# Patient Record
Sex: FEMALE
[De-identification: names, ages, dates, MRNs, and addresses within clinical notes are randomized per-mention and may not be internally consistent; named-entity substitution may affect disease eponyms.]

---
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
## 2022-03-10 ENCOUNTER — APPOINTMENT (OUTPATIENT)
Dept: ENDOCRINOLOGY | Facility: CLINIC | Age: 22
End: 2022-03-10
Payer: MEDICAID

## 2022-03-10 VITALS
HEIGHT: 60.63 IN | WEIGHT: 149 LBS | SYSTOLIC BLOOD PRESSURE: 115 MMHG | BODY MASS INDEX: 28.5 KG/M2 | HEART RATE: 74 BPM | DIASTOLIC BLOOD PRESSURE: 76 MMHG

## 2022-03-10 PROCEDURE — 99204 OFFICE O/P NEW MOD 45 MIN: CPT

## 2022-03-11 LAB
25(OH)D3 SERPL-MCNC: 11.2 NG/ML
ALBUMIN SERPL ELPH-MCNC: 4.7 G/DL
ALP BLD-CCNC: 48 U/L
ALT SERPL-CCNC: 30 U/L
ANION GAP SERPL CALC-SCNC: 11 MMOL/L
AST SERPL-CCNC: 28 U/L
BASOPHILS # BLD AUTO: 0.01 K/UL
BASOPHILS NFR BLD AUTO: 0.2 %
BILIRUB SERPL-MCNC: 0.5 MG/DL
BUN SERPL-MCNC: 12 MG/DL
CALCIUM SERPL-MCNC: 9.3 MG/DL
CHLORIDE SERPL-SCNC: 103 MMOL/L
CHOLEST SERPL-MCNC: 192 MG/DL
CO2 SERPL-SCNC: 24 MMOL/L
CREAT SERPL-MCNC: 0.73 MG/DL
EGFR: 121 ML/MIN/1.73M2
EOSINOPHIL # BLD AUTO: 0.06 K/UL
EOSINOPHIL NFR BLD AUTO: 1.1 %
ESTIMATED AVERAGE GLUCOSE: 82 MG/DL
GLUCOSE SERPL-MCNC: 85 MG/DL
HBA1C MFR BLD HPLC: 4.5 %
HCG SERPL-MCNC: <1 MIU/ML
HCT VFR BLD CALC: 41.1 %
HDLC SERPL-MCNC: 54 MG/DL
HGB BLD-MCNC: 12.8 G/DL
IMM GRANULOCYTES NFR BLD AUTO: 0.2 %
LDLC SERPL CALC-MCNC: 123 MG/DL
LYMPHOCYTES # BLD AUTO: 2.36 K/UL
LYMPHOCYTES NFR BLD AUTO: 43.6 %
MAN DIFF?: NORMAL
MCHC RBC-ENTMCNC: 29.2 PG
MCHC RBC-ENTMCNC: 31.1 GM/DL
MCV RBC AUTO: 93.8 FL
MONOCYTES # BLD AUTO: 0.3 K/UL
MONOCYTES NFR BLD AUTO: 5.5 %
NEUTROPHILS # BLD AUTO: 2.67 K/UL
NEUTROPHILS NFR BLD AUTO: 49.4 %
NONHDLC SERPL-MCNC: 138 MG/DL
PLATELET # BLD AUTO: 231 K/UL
POTASSIUM SERPL-SCNC: 4.7 MMOL/L
PROT SERPL-MCNC: 7.4 G/DL
RBC # BLD: 4.38 M/UL
RBC # FLD: 13.4 %
SODIUM SERPL-SCNC: 139 MMOL/L
T3 SERPL-MCNC: 137 NG/DL
T4 FREE SERPL-MCNC: 1.1 NG/DL
T4 SERPL-MCNC: 7.2 UG/DL
THYROGLOB AB SERPL-ACNC: <20 IU/ML
THYROPEROXIDASE AB SERPL IA-ACNC: 13.2 IU/ML
TRIGL SERPL-MCNC: 75 MG/DL
TSH SERPL-ACNC: 1.93 UIU/ML
VIT B12 SERPL-MCNC: 343 PG/ML
WBC # FLD AUTO: 5.41 K/UL

## 2022-04-08 NOTE — PHYSICAL EXAM
[Alert] : alert [Healthy Appearance] : healthy appearance [No Acute Distress] : no acute distress [Normal Sclera/Conjunctiva] : normal sclera/conjunctiva [Normal Hearing] : hearing was normal [No Neck Mass] : no neck mass was observed [No LAD] : no lymphadenopathy [Supple] : the neck was supple [Thyroid Not Enlarged] : the thyroid was not enlarged [No Respiratory Distress] : no respiratory distress [Clear to Auscultation] : lungs were clear to auscultation bilaterally [Normal S1, S2] : normal S1 and S2 [Normal Rate] : heart rate was normal [Regular Rhythm] : with a regular rhythm [No Stigmata of Cushings Syndrome] : no stigmata of Cushings Syndrome [Normal Gait] : normal gait [Hirsutism] : hirsutism present [Normal Insight/Judgement] : insight and judgment were intact [Kyphosis] : no kyphosis present [Acanthosis Nigricans] : no acanthosis nigricans [de-identified] : no moon facies, no supraclavicular fat pads

## 2022-04-08 NOTE — ASSESSMENT
[FreeTextEntry1] : Polycystic ovarian syndrome. She has met criteria for polycystic ovarian syndrome in the setting of oligomenorrhea and clinical evidence of hyperandrogenism. Evaluation for secondary causes of irregular menses previously unremarkable. We discussed the potential risks of polycystic ovarian syndrome, including but not limited to endometrial hyperplasia/cancer, issues with androgen excess, metabolic effects/insulin resistance. We discussed that first line therapy for polycystic ovarian syndrome is a combined oral contraceptive pill. We discussed the risks and benefits of a combined oral contraceptive pill, including but not limited to thromboembolic risk. We will start a combined oral contraceptive pill with norgestimate, with low androgenic activity and low thromboembolic risk.\par \par Abnormal thyroid function test. We will send a thyroid function panel; thyroid peroxidase and thyroglobulin antibodies now. Further evaluation and management pending results. \par \par Return to see me in 3 months. \par \par CC:\par Dr. Conner Jhaveri, Fax [phone]

## 2022-04-08 NOTE — HISTORY OF PRESENT ILLNESS
[FreeTextEntry1] : Ms. Nascimento is a 21 year-old woman presenting to establish care with me for polycystic ovarian syndrome and an abnormal thyroid function test. She is a former patient of Dr. Patience Springer, last seen in March 2019. She is studying occupational therapy. \par \par Polycystic ovarian syndrome.\par She was diagnosed with polycystic ovarian syndrome in the setting of oligomenorrhea and clinical evidence of hyperandrogenism. Evaluation for other causes of oligomenorrhea previously unremarkable. \par Menstrual periods are every 40-50 days. \par She shaves hirsutism. She has had hair thinning and acne. \par She was on a combined oral contraceptive pill in the past; she does not remember the name. \par \par Abnormal thyroid function test.\par She was noted to have an abnormal thyroid function test a few months ago; report not available.\par No previous history of abnormal thyroid function tests.\par She is not taking any medications.\par No history of radiation exposure.\par Mother with history of hypothyroidism. Maternal cousins with thyroid disease.\par \par She has had constipation. She has had occasional depression/anxiety. No change in weight, palpitations, diarrhea, skin changes.

## 2022-04-08 NOTE — ADDENDUM
[FreeTextEntry1] : Recent laboratory results as below. TSH, T4/T3 within range. Thyroid peroxidase and thyroglobulin antibodies negative. HbA1c within range. Recommend lifestyle modification for lipid panel. Vitamin D low and recommend ergocalciferol 50,000 intl units weekly for three months. Other test results within range. I left a telephone message for Ms. Nascimento to discuss. 3/11/22\par \par Ms. Nascimento left a telephone message to discuss results. I was unable to reach her and left a telephone message for call back. I will send her a letter with her results. 3/31/22\par \par I spoke with Ms. Nascimento about her results above. She had questions about irregular bleeding when starting a combined oral contraceptive pill. 4/08/22

## 2022-06-09 ENCOUNTER — APPOINTMENT (OUTPATIENT)
Dept: ENDOCRINOLOGY | Facility: CLINIC | Age: 22
End: 2022-06-09

## 2022-09-28 ENCOUNTER — APPOINTMENT (OUTPATIENT)
Dept: ENDOCRINOLOGY | Facility: CLINIC | Age: 22
End: 2022-09-28

## 2022-09-28 VITALS — SYSTOLIC BLOOD PRESSURE: 126 MMHG | DIASTOLIC BLOOD PRESSURE: 75 MMHG | WEIGHT: 144 LBS | HEART RATE: 69 BPM

## 2022-09-28 DIAGNOSIS — R79.89 OTHER SPECIFIED ABNORMAL FINDINGS OF BLOOD CHEMISTRY: ICD-10-CM

## 2022-09-28 PROCEDURE — 99214 OFFICE O/P EST MOD 30 MIN: CPT

## 2022-09-28 NOTE — PHYSICAL EXAM
[Alert] : alert [Healthy Appearance] : healthy appearance [No Acute Distress] : no acute distress [Normal Sclera/Conjunctiva] : normal sclera/conjunctiva [Normal Hearing] : hearing was normal [No Respiratory Distress] : no respiratory distress [No Stigmata of Cushings Syndrome] : no stigmata of Cushings Syndrome [Normal Gait] : normal gait [Normal Insight/Judgement] : insight and judgment were intact [Kyphosis] : no kyphosis present [Acanthosis Nigricans] : no acanthosis nigricans [de-identified] : no moon facies, no supraclavicular fat pads

## 2022-09-28 NOTE — ASSESSMENT
[FreeTextEntry1] : Polycystic ovarian syndrome. She has met criteria for polycystic ovarian syndrome in the setting of oligomenorrhea and clinical evidence of hyperandrogenism. Evaluation for secondary causes of irregular menses previously unremarkable. We discussed the potential risks of polycystic ovarian syndrome, including but not limited to endometrial hyperplasia/cancer, issues with androgen excess, metabolic effects/insulin resistance. We discussed that first line therapy for polycystic ovarian syndrome is a combined oral contraceptive pill. We discussed the risks and benefits of a combined oral contraceptive pill, including but not limited to thromboembolic risk. She is tolerating a combined oral contraceptive pill with norgestimate, with low androgenic activity and low thromboembolic risk, and we will continue. She has declined consideration of off-label spironolactone for hirsutism. \par \par Vitamin D deficiency. We will adjust ergocalciferol to 50,000 intl units every two weeks. \par \par Return to see me in 1 year or earlier as needed.\par \par CC:\par Dr. Conner Jhaveri, Fax [phone]

## 2022-09-28 NOTE — HISTORY OF PRESENT ILLNESS
[FreeTextEntry1] : Ms. Nascimento is a 21 year-old woman presenting for follow-up of polycystic ovarian syndrome and vitamin D deficiency. I saw her for an initial visit in March 2022; she is a former patient of Dr. Patience Springer. \par \par Polycystic ovarian syndrome.\par She was diagnosed with polycystic ovarian syndrome in the setting of oligomenorrhea and clinical evidence of hyperandrogenism. Evaluation for other causes of oligomenorrhea previously unremarkable. \par Menstrual periods were every 40-50 days prior to initiation of a combined oral contraceptive pill. \par She shaves hirsutism. She has had acne, improved with therapy. \par She was on a combined oral contraceptive pill in the past; she does not remember the name. In March 2022 we started a combined oral contraceptive pill with norgestimate and she has been tolerating well. \par \par Interim History \par Laboratory results from last visit as below. TSH, T4/T3 within range. Thyroid peroxidase and thyroglobulin antibodies negative. HbA1c within range. Recommended lifestyle modifications for lipid panel. Vitamin D low and recommended ergocalciferol 50,000 intl units weekly. Other test results within range.\par In March 2022 we started a combined oral contraceptive pill with norgestimate and she has been tolerating well. \par Acne has improved. Hirsutism is stable. Mood is improved. Weight is down 5 pounds since last visit. She is studying occupational therapy. \par Medical and surgical history, medications, allergies, social and family history reviewed and updated as needed.

## 2024-06-13 ENCOUNTER — APPOINTMENT (OUTPATIENT)
Dept: ENDOCRINOLOGY | Facility: CLINIC | Age: 24
End: 2024-06-13
Payer: MEDICAID

## 2024-06-13 VITALS — HEART RATE: 87 BPM | SYSTOLIC BLOOD PRESSURE: 110 MMHG | WEIGHT: 135 LBS | DIASTOLIC BLOOD PRESSURE: 71 MMHG

## 2024-06-13 DIAGNOSIS — E55.9 VITAMIN D DEFICIENCY, UNSPECIFIED: ICD-10-CM

## 2024-06-13 DIAGNOSIS — E28.2 POLYCYSTIC OVARIAN SYNDROME: ICD-10-CM

## 2024-06-13 PROCEDURE — 99214 OFFICE O/P EST MOD 30 MIN: CPT | Mod: 25

## 2024-06-13 RX ORDER — MULTIVITAMIN
TABLET ORAL
Refills: 0 | Status: ACTIVE | COMMUNITY

## 2024-06-13 RX ORDER — NORGESTIMATE AND ETHINYL ESTRADIOL 7DAYSX3 LO
0.18/0.215/0.25 KIT ORAL DAILY
Qty: 4 | Refills: 3 | Status: ACTIVE | COMMUNITY
Start: 2022-03-10 | End: 1900-01-01

## 2024-06-14 LAB
25(OH)D3 SERPL-MCNC: 15.4 NG/ML
ALBUMIN SERPL ELPH-MCNC: 4.5 G/DL
ALP BLD-CCNC: 43 U/L
ALT SERPL-CCNC: 13 U/L
ANION GAP SERPL CALC-SCNC: 9 MMOL/L
AST SERPL-CCNC: 17 U/L
BASOPHILS # BLD AUTO: 0.01 K/UL
BASOPHILS NFR BLD AUTO: 0.2 %
BILIRUB SERPL-MCNC: 0.6 MG/DL
BUN SERPL-MCNC: 14 MG/DL
CALCIUM SERPL-MCNC: 9.1 MG/DL
CHLORIDE SERPL-SCNC: 104 MMOL/L
CHOLEST SERPL-MCNC: 176 MG/DL
CO2 SERPL-SCNC: 24 MMOL/L
CREAT SERPL-MCNC: 0.74 MG/DL
EGFR: 118 ML/MIN/1.73M2
EOSINOPHIL # BLD AUTO: 0.05 K/UL
EOSINOPHIL NFR BLD AUTO: 1.1 %
ESTIMATED AVERAGE GLUCOSE: 85 MG/DL
GLUCOSE SERPL-MCNC: 84 MG/DL
HBA1C MFR BLD HPLC: 4.6 %
HCT VFR BLD CALC: 38.5 %
HDLC SERPL-MCNC: 59 MG/DL
HGB BLD-MCNC: 12.2 G/DL
IMM GRANULOCYTES NFR BLD AUTO: 0.2 %
LDLC SERPL CALC-MCNC: 110 MG/DL
LYMPHOCYTES # BLD AUTO: 1.62 K/UL
LYMPHOCYTES NFR BLD AUTO: 35.4 %
MAN DIFF?: NORMAL
MCHC RBC-ENTMCNC: 30 PG
MCHC RBC-ENTMCNC: 31.7 GM/DL
MCV RBC AUTO: 94.8 FL
MONOCYTES # BLD AUTO: 0.32 K/UL
MONOCYTES NFR BLD AUTO: 7 %
NEUTROPHILS # BLD AUTO: 2.57 K/UL
NEUTROPHILS NFR BLD AUTO: 56.1 %
NONHDLC SERPL-MCNC: 117 MG/DL
PLATELET # BLD AUTO: 189 K/UL
POTASSIUM SERPL-SCNC: 4.6 MMOL/L
PROT SERPL-MCNC: 7.1 G/DL
RBC # BLD: 4.06 M/UL
RBC # FLD: 12.9 %
SODIUM SERPL-SCNC: 136 MMOL/L
TRIGL SERPL-MCNC: 32 MG/DL
TSH SERPL-ACNC: 1.39 UIU/ML
VIT B12 SERPL-MCNC: 239 PG/ML
WBC # FLD AUTO: 4.58 K/UL

## 2024-06-14 RX ORDER — ERGOCALCIFEROL 1.25 MG/1
1.25 MG CAPSULE ORAL
Qty: 12 | Refills: 3 | Status: ACTIVE | COMMUNITY
Start: 2022-03-11 | End: 1900-01-01

## 2024-06-14 NOTE — PHYSICAL EXAM
[Alert] : alert [Healthy Appearance] : healthy appearance [No Acute Distress] : no acute distress [Normal Sclera/Conjunctiva] : normal sclera/conjunctiva [Normal Hearing] : hearing was normal [No Respiratory Distress] : no respiratory distress [No Stigmata of Cushings Syndrome] : no stigmata of Cushings Syndrome [Normal Gait] : normal gait [Normal Insight/Judgement] : insight and judgment were intact [Kyphosis] : no kyphosis present [Acanthosis Nigricans] : no acanthosis nigricans [de-identified] : no moon facies, no supraclavicular fat pads

## 2024-06-14 NOTE — HISTORY OF PRESENT ILLNESS
[FreeTextEntry1] : Ms. Nascimento is a 23 year-old woman presenting for follow-up of polycystic ovarian syndrome and vitamin D deficiency. I saw her for an initial visit in March 2022 and last in September 2022.  Polycystic ovarian syndrome. She was diagnosed with polycystic ovarian syndrome in the setting of oligomenorrhea and clinical evidence of hyperandrogenism. Evaluation for other causes of oligomenorrhea previously unremarkable.  Menstrual periods were every 40-50 days prior to initiation of a combined oral contraceptive pill.  She shaves hirsutism. She has had acne, improved with topical therapy.  She was on a combined oral contraceptive pill in the past; she did not remember the name. In March 2022 we started a combined oral contraceptive pill with norgestimate and she has tolerated well, with improvement in hirsutism and acne.   Interim History  She started a trial off her combined oral contraceptive pill with increase in hirsutism. She restarted treatment.  She has been off ergocalciferol for approximately two months due to a lapsed prescription.  Weight is down 9 pounds since last visit. She is studying for a master's degree in occupational therapy.  Medical and surgical history, medications, allergies, social and family history reviewed and updated as needed.

## 2024-06-14 NOTE — ADDENDUM
[FreeTextEntry1] : Recent laboratory results as below; discussed with Ms. Nascimento. 25-hydroxyvitamin D low and recommended ergocalciferol 50,000 intl units weekly x 12 weeks, then every other week. Statin therapy not indicated for lipid panel due to age; recommended lifestyle modifications. Other test results within range. 6/14/24

## 2024-06-14 NOTE — ASSESSMENT
[FreeTextEntry1] : Polycystic ovarian syndrome. She has met criteria for polycystic ovarian syndrome in the setting of oligomenorrhea and clinical evidence of hyperandrogenism. Evaluation for secondary causes of irregular menses previously unremarkable. We have discussed the potential risks of polycystic ovarian syndrome, including but not limited to endometrial hyperplasia/cancer, issues with androgen excess, metabolic effects/insulin resistance. We have discussed that first line therapy for polycystic ovarian syndrome is a combined oral contraceptive pill. We discussed the risks and benefits of a combined oral contraceptive pill, including but not limited to thromboembolic risk. She is tolerating a combined oral contraceptive pill with norgestimate, with low androgenic activity and low thromboembolic risk, and we will continue. Hirsutism and acne are under good control with a combined oral contraceptive pill.   Vitamin D deficiency. We will restart ergocalciferol to 50,000 intl units every two weeks.   Return to see me in 1 year or earlier as needed.  CC: Dr. Conner Jhaveri, Fax [phone]